# Patient Record
Sex: MALE | Race: WHITE | NOT HISPANIC OR LATINO | Employment: FULL TIME | URBAN - METROPOLITAN AREA
[De-identification: names, ages, dates, MRNs, and addresses within clinical notes are randomized per-mention and may not be internally consistent; named-entity substitution may affect disease eponyms.]

---

## 2019-03-28 ENCOUNTER — OFFICE VISIT (OUTPATIENT)
Dept: UROLOGY | Facility: MEDICAL CENTER | Age: 39
End: 2019-03-28
Payer: COMMERCIAL

## 2019-03-28 VITALS
WEIGHT: 179 LBS | HEART RATE: 85 BPM | SYSTOLIC BLOOD PRESSURE: 144 MMHG | BODY MASS INDEX: 23.72 KG/M2 | HEIGHT: 73 IN | DIASTOLIC BLOOD PRESSURE: 82 MMHG

## 2019-03-28 DIAGNOSIS — R39.198 URINE STREAM SPRAYING: ICD-10-CM

## 2019-03-28 DIAGNOSIS — N50.89 EPIDIDYMAL MASS: Primary | ICD-10-CM

## 2019-03-28 LAB
POST-VOID RESIDUAL VOLUME, ML POC: 8 ML
SL AMB  POCT GLUCOSE, UA: NEGATIVE
SL AMB LEUKOCYTE ESTERASE,UA: NEGATIVE
SL AMB POCT BILIRUBIN,UA: NEGATIVE
SL AMB POCT BLOOD,UA: NEGATIVE
SL AMB POCT CLARITY,UA: CLEAR
SL AMB POCT COLOR,UA: YELLOW
SL AMB POCT KETONES,UA: NORMAL
SL AMB POCT NITRITE,UA: NEGATIVE
SL AMB POCT PH,UA: 6
SL AMB POCT SPECIFIC GRAVITY,UA: 1.02
SL AMB POCT URINE PROTEIN: NEGATIVE
SL AMB POCT UROBILINOGEN: 0.2

## 2019-03-28 PROCEDURE — 81003 URINALYSIS AUTO W/O SCOPE: CPT | Performed by: UROLOGY

## 2019-03-28 PROCEDURE — 51798 US URINE CAPACITY MEASURE: CPT | Performed by: UROLOGY

## 2019-03-28 PROCEDURE — 99244 OFF/OP CNSLTJ NEW/EST MOD 40: CPT | Performed by: UROLOGY

## 2019-03-28 RX ORDER — CELECOXIB 200 MG/1
CAPSULE ORAL DAILY
COMMUNITY
Start: 2019-02-15

## 2019-03-28 RX ORDER — METOPROLOL SUCCINATE 25 MG
TABLET, EXTENDED RELEASE 24 HR ORAL DAILY
COMMUNITY
Start: 2019-02-19

## 2019-03-28 NOTE — PATIENT INSTRUCTIONS
If he feels the urination needs further evaluation, call here and we will schedule a bladder examination  We will have be taken oral sedative beforehand

## 2019-03-28 NOTE — PROGRESS NOTES
Assessment/Plan:  1  His PVR is minimal, urinalysis clear  Discussed in detail, if the stream is a problem, I would do an office cystoscopy with oral sedative  I think the yield would be low, since his bladder is emptying and the urine is clear  2  Prostate exam is within normal range  3  Epididymal thickening, only tender when he checks it on a daily basis  I do not think it is bacterial epididymitis that would need antibiotic  Ultrasound scrotum to evaluate  Follow-up six months, sooner if the problem require cystoscopy  No problem-specific Assessment & Plan notes found for this encounter  Diagnoses and all orders for this visit:    Epididymal mass  -     POCT Measure PVR  -     POCT urine dip auto non-scope  -     US scrotum and testicles; Future    Urine stream spraying    Other orders  -     Multiple Vitamins-Minerals (MULTIVITAMIN PO); daily  -     celecoxib (CeleBREX) 200 mg capsule; daily  -     TOPROL XL 25 MG 24 hr tablet; daily          Subjective:      Patient ID: Blue Shirley is a 34 y o  male  1  Concerned about thickening left lower pole epididymis  Diagnosed with epididymitis several times in the past few years  Does not really have tenderness except if he checked himself on a daily basis  2  Intermittent problems with slowing of urinary flow and spraying  Nocturia times 1-2, but drinks lots of fluids all day long up through 9:00 p m   No burning or tremendous urgency  The following portions of the patient's history were reviewed and updated as appropriate: allergies, current medications, past family history, past medical history, past social history, past surgical history and problem list     Review of Systems   All other systems reviewed and are negative          Objective:      /82 (BP Location: Left arm, Patient Position: Sitting, Cuff Size: Standard)   Pulse 85   Ht 6' 1" (1 854 m)   Wt 81 2 kg (179 lb)   BMI 23 62 kg/m²          Physical Exam Constitutional: He is oriented to person, place, and time  He appears well-developed and well-nourished  No distress  HENT:   Head: Normocephalic and atraumatic  Eyes: Conjunctivae are normal    Cardiovascular: Normal rate and regular rhythm  Pulmonary/Chest: Effort normal and breath sounds normal  No respiratory distress  He has no wheezes  Abdominal: Soft  Bowel sounds are normal  He exhibits no distension and no mass  There is no tenderness  Genitourinary:   Genitourinary Comments: Genitalia shows thickening lower pole left epididymis and right epididymis  Both testicles nontender    Prostate minimally enlarged no nodules, perhaps slightly larger than I would expect for age 34  No nodules or tenderness   Neurological: He is alert and oriented to person, place, and time  Skin: Skin is warm and dry  He is not diaphoretic

## 2019-04-02 ENCOUNTER — HOSPITAL ENCOUNTER (OUTPATIENT)
Dept: RADIOLOGY | Facility: IMAGING CENTER | Age: 39
Discharge: HOME | End: 2019-04-02
Payer: COMMERCIAL

## 2019-04-02 DIAGNOSIS — N50.89 EPIDIDYMAL MASS: ICD-10-CM

## 2019-04-02 PROCEDURE — 76870 US EXAM SCROTUM: CPT

## 2019-04-03 ENCOUNTER — TELEPHONE (OUTPATIENT)
Dept: UROLOGY | Facility: MEDICAL CENTER | Age: 39
End: 2019-04-03

## 2025-07-22 ENCOUNTER — APPOINTMENT (OUTPATIENT)
Dept: RADIOLOGY | Age: 45
End: 2025-07-22
Payer: COMMERCIAL

## 2025-07-22 DIAGNOSIS — M54.41 RIGHT-SIDED LOW BACK PAIN WITH RIGHT-SIDED SCIATICA, UNSPECIFIED CHRONICITY: ICD-10-CM

## 2025-07-22 PROCEDURE — 72110 X-RAY EXAM L-2 SPINE 4/>VWS: CPT
